# Patient Record
Sex: FEMALE | Race: WHITE | NOT HISPANIC OR LATINO | URBAN - METROPOLITAN AREA
[De-identification: names, ages, dates, MRNs, and addresses within clinical notes are randomized per-mention and may not be internally consistent; named-entity substitution may affect disease eponyms.]

---

## 2017-12-06 ENCOUNTER — OFFICE VISIT - RIVER FALLS (OUTPATIENT)
Dept: FAMILY MEDICINE | Facility: CLINIC | Age: 27
End: 2017-12-06

## 2017-12-06 ASSESSMENT — MIFFLIN-ST. JEOR: SCORE: 1399.57

## 2022-02-11 VITALS
DIASTOLIC BLOOD PRESSURE: 74 MMHG | HEIGHT: 62 IN | SYSTOLIC BLOOD PRESSURE: 122 MMHG | BODY MASS INDEX: 29.63 KG/M2 | TEMPERATURE: 98.8 F | WEIGHT: 161 LBS | HEART RATE: 76 BPM

## 2022-02-16 NOTE — TELEPHONE ENCOUNTER
Entered by Nancy Gonzáles CMA on January 07, 2019 1:37:21 PM CST  [facility] @ 1335. Patient is overdue for annual/gyn appt. Letters sent and message to pharmacy sent along with protocol fill last month. Patients needs to establish care now for further refills. Last visit 12/06/2017 w/ [facility]      ------------------------------------------  From: [id number]  To: Emi Mckeon  Sent: January 7, 2019 3:11:34 AM CST  Subject: Medication Management  Due: January 8, 2019 3:11:34 AM CST    ** On Hold Pending Signature **  Drug: ethinyl estradiol-norgestimate (TriNessa oral tablet)  1 TAB(S) ORAL DAILY  Quantity: 28 tab(s)     Days Supply: 0         Refills: 0  Substitutions Allowed  Notes from Pharmacy: Pt due physical    Dispensed Drug: ethinyl estradiol-norgestimate (Tri-Sprintec oral tablet)  TAKE 1 TABLET BY MOUTH ONCE DAILY  Quantity: 28 tab(s)     Days Supply: 28        Refills: 0  Substitutions Allowed  Notes from Pharmacy:   ---------------------------------------------------------------  From: Lynn De La Cruz CMA   To: [id number]    Sent: 1/9/2019 7:59:12 AM CST  Subject: Medication Management     ** Not Approved: Patient needs appointment **  ethinyl estradiol-norgestimate (TRI-SPRINTEC TABLETS 28)  TAKE 1 TABLET BY MOUTH ONCE DAILY  Qty:  28 tab(s)        Days Supply:  28        Refills:  0          MARCUS     Route To Pharmacy - [id number]   Signed by Lynn De La Cruz CMA

## 2022-02-16 NOTE — PROGRESS NOTES
Patient:   GENEVA REY            MRN: [id number]            FIN: [id number]               Age:   27 years     Sex:  Female     :  1990   Associated Diagnoses:   None   Author:   Khalida Clark      Visit Information      Date of Service: 2017 06:00 pm  Performing Location: [facility]  Encounter#: [id number]      Chief Complaint   2017 6:08 PM CST    Patient presents for annual physical exam.      Well Adult History   Chief complaint reviewed and confirmed with patient.    Well Adult History             The patient presents for well adult exam.  The patient's general health status is described as good.  The patient's diet is described as balanced.  Exercise: routine, aerobic activity, anaerobic activity.  Associated symptoms consist of none.  Last menstrual period: Regular withdrawal bleeds on OCPs.  Additional pertinent history: no caffeine use, tobacco use none and alcohol use socially.        Review of Systems   Constitutional:  Negative.    Eye:  Negative.    Ear/Nose/Mouth/Throat:  Negative.    Respiratory:  Negative.    Cardiovascular:  Negative.    Breast:  Negative.    Gastrointestinal:  Negative.    Genitourinary:  Negative.    Gynecologic:  Negative, Negative except as documented in history of present illness.    Hematology/Lymphatics:  Negative.    Endocrine:  Negative.    Immunologic:  Negative.    Musculoskeletal:  Negative.    Integumentary:  Negative.    Neurologic:  Negative.    Psychiatric:  Negative.    All other systems reviewed and negative      Health Status   Allergies:    Allergic Reactions (Selected)  Severity Not Documented  Cats (No reactions were documented)   Medications:  (Selected)   Prescriptions  Prescribed  TriNessa oral tablet: See Instructions, Instructions: TAKE 1 TABLET BY MOUTH DAILY, # 84 tab(s), Type: Soft Stop, Pharmacy: [facility] [id number]   Problem list:    All Problems  History of chickenpox / SNOMED CT  [id number] / Confirmed  Obesity / SNOMED CT [id number] / Probable      Histories   Past Medical History:    Active  History of chickenpox ([id number]): Onset in the month of 3/1993 at 13 months   Family History:    No family history items have been selected or recorded.   Procedure history:    Tonsillectomy and adenoidectomy (228951945) on 7/17/1995 at 5 Years.  Myringotomy and insertion of T tube ([id number]) on 4/17/1995 at 5 Years.  Comments:  1/6/2014 2:37 PM - Lidya Chavira  Bilateral  Myringotomy and insertion of T tube ([id number]) in the month of 4/1993 at 16 Months.  Comments:  1/6/2014 2:26 PM - Lidya Chavira  Bilateral      Physical Examination   Last Menstrual Period: 11/18/2017   Vital Signs   12/6/2017 6:08 PM CST Temperature Tympanic 98.8 DegF    Peripheral Pulse Rate 76 bpm    Pulse Site Radial artery    HR Method Manual    Systolic Blood Pressure 122 mmHg    Diastolic Blood Pressure 74 mmHg    Mean Arterial Pressure 90 mmHg    BP Site Right arm    BP Method Manual      Measurements from flowsheet : Measurements   12/6/2017 6:08 PM CST Height Measured - Standard 61.75 in    Weight Measured - Standard 161 lb    BSA 1.78 m2    Body Mass Index 29.68 kg/m2      General:  Alert and oriented, No acute distress.    Eye:  Pupils are equal, round and reactive to light, Normal conjunctiva.    HENT:  Normocephalic, Oral mucosa is moist.    Neck:  Supple, Non-tender, No lymphadenopathy, No thyromegaly.    Respiratory:  Lungs are clear to auscultation, Breath sounds are equal.    Cardiovascular:  Normal rate, Regular rhythm, No murmur.    Breast:  No mass, No tenderness, No discharge.    Gastrointestinal:  Soft, Non-tender, Non-distended, No organomegaly.    Lymphatics:  No lymphadenopathy neck, axilla, groin.    Integumentary:  Warm, Dry, Pink, No rash.    Neurologic:  Alert, Oriented.    Psychiatric:  Cooperative, Appropriate mood & affect.        Impression and Plan   Patient Instructions:       Counseled: Patient, Regarding diagnosis, Verbalized understanding.

## 2022-02-16 NOTE — NURSING NOTE
Med Refill  Request from Kaylynn in [facility] for Trinessa    Date of last office visit and reason:  12/6/17 Physical with MF      Date of last labs pertaining to med:  na    RTC order in chart:  Yes pt is due this month for her physical and to establish care with another provider. I sent in 1 month per protocol to give her time to make an appt.    CMcRoberts CMA